# Patient Record
(demographics unavailable — no encounter records)

---

## 2017-11-22 PROCEDURE — 88175 CYTOPATH C/V AUTO FLUID REDO: CPT | Performed by: FAMILY MEDICINE

## 2025-02-11 NOTE — ED INITIAL ASSESSMENT (HPI)
Pt arrives through triage with       complaints of right lower back pain radiating to her right flank. +nausea. Pain started today

## 2025-02-11 NOTE — ED PROVIDER NOTES
Patient Seen in: [facility] Emergency Department      History     Chief Complaint   Patient presents with    Urinary Symptoms     Stated Complaint: R low back pain, vomiting    Subjective:   HPI      Patient is a 31-year-old female presents with right-sided nonradiating back pain that started around 4 PM.  Denies any heavy lifting or trauma.  No relief with Tylenol.  Positive nausea and vomiting due to the pain.  No history of similar pain in the past.  No numbness in her legs.  Denies any shortness of breath or fevers.  No dysuria.    Objective:     History reviewed. No pertinent past medical history.           History reviewed. No pertinent surgical history.             Social History     Socioeconomic History    Marital status:    Tobacco Use    Smoking status: Never    Smokeless tobacco: Never   Vaping Use    Vaping status: Never Used   Substance and Sexual Activity    Alcohol use: Yes     Comment: socially    Drug use: Never                  Physical Exam     ED Triage Vitals [02/10/25 2308]   /86   Pulse 96   Resp 23   Temp 97.6 °F (36.4 °C)   Temp src    SpO2 98 %   O2 Device None (Room air)       Current Vitals:   Vital Signs  BP: 134/76  Pulse: 79  Resp: 18  Temp: 97.6 °F (36.4 °C)  MAP (mmHg): (!) 103    Oxygen Therapy  SpO2: 99 %  O2 Device: None (Room air)        Physical Exam  GENERAL: mild distress, awake and alert  HEENT: EOMI, PERRL  Neck: supple  CV: RRR, no murmurs  Resp: CTAB, no wheezes or retractions  Ab: soft, nontender, no distension, murphys negative  Back: TTP over right paralumbar area. Straight leg raise negative b/l  Extremities: FROM of all extremities, no edema  Neuro: CN intact, normal speech, normal gait, 5/5 motor strength in all extremities, no focal deficits  SKIN: warm, dry, no rashes      ED Course     Labs Reviewed   URINALYSIS WITH CULTURE REFLEX - Abnormal; Notable for the following components:       Result Value    Spec Gravity >1.030 (*)     Protein  Urine 20 (*)     Leukocyte Esterase Urine 75 (*)     Squamous Epi. Cells Few (*)     All other components within normal limits   BASIC METABOLIC PANEL (8) - Abnormal; Notable for the following components:    Glucose 144 (*)     All other components within normal limits   HEPATIC FUNCTION PANEL (7) - Abnormal; Notable for the following components:    Albumin 4.9 (*)     All other components within normal limits   CBC WITH DIFFERENTIAL WITH PLATELET - Abnormal; Notable for the following components:    WBC 13.5 (*)     Neutrophil Absolute Prelim 11.43 (*)     Neutrophil Absolute 11.43 (*)     All other components within normal limits   POCT PREGNANCY URINE - Normal   URINE CULTURE, ROUTINE        MDM        Medical Decision Making  Ddx: UTI, kidney stone, pyelonephritis, muscle strain, sciatica  Pt given toradol    Labs reassuring  Reassessment: pain improved but still present. Morphine/ultrasound ordered. Some tenderness in RUQ with no guarding and negative murphys    Reassessment: pain improved. Feels comfortable with discharge. Suspect muscle pain but advised of signs/symptoms of gallbladder issues. Advise surgery f/u and return precautions    Amount and/or Complexity of Data Reviewed  Labs: ordered.  Radiology: ordered.     Details:   CT ABDOMEN AND PELVIS WITHOUT CONTRAST    COMPARISON: None.    IMPRESSION:  Distended gallbladder, nonspecific and may be related to fasting state.  Correlate clinically, and if there is concern for acute biliary pathology can obtain right upper quadrant ultrasound for further assessment.    No obstructive urolithiasis.  No hydronephrosis.    No other acute abnormality in the abdomen or pelvis.  No appendicitis.      Report finalized on 02/11/25 at 2:37 AM ET.      Dr. Chery Chin MD    RUQ US    COMPARISON: CT of the same date.      IMPRESSION:  Cholelithiasis.  Moderately distended gallbladder.  No gallbladder wall thickening or pericholecystic fluid.    Equivocal Guerrero sign as  patient was premedicated.    Given normal LFTs, unlikely to represent acute cholecystitis.      CBD in the upper limits of normal measuring up to 6 mm in diameter, also favored within normal limits given normal LFTs, but if there is clinical concern for choledocholithiasis can obtain MRCP for further assessment.    Hepatic steatosis      Report finalized on 02/11/25 at 3:30 AM ET.      Dr. Chery Chin MD        Risk  OTC drugs.  Prescription drug management.  Parenteral controlled substances.        Disposition and Plan     Clinical Impression:  1. Back pain of thoracolumbar region    2. Gallstones         Disposition:  Discharge  2/11/2025  2:49 am    Follow-up:  Marcy Hurd  [address] [place] [id number] [phone]    Follow up in 2 day(s)      Tierney Hung MD  [address] [id number] [phone]    Follow up            Medications Prescribed:  Current Discharge Medication List        START taking these medications    Details   HYDROcodone-acetaminophen 5-325 MG Oral Tab Take 1-2 tablets by mouth every 6 (six) hours as needed for Pain.  Qty: 10 tablet, Refills: 0    Associated Diagnoses: Gallstones                 Supplementary Documentation:

## 2025-02-11 NOTE — ED QUICK NOTES
Rounding Completed    Plan of Care reviewed. Waiting for US.  Elimination needs assessed.  Patient resting in bed, speaking in full sentences. Pt on VS monitor for frequent VS assessments. No new requests at this time.     Bed is locked and in lowest position. Call light within reach.